# Patient Record
Sex: MALE | Race: BLACK OR AFRICAN AMERICAN | Employment: FULL TIME | URBAN - METROPOLITAN AREA
[De-identification: names, ages, dates, MRNs, and addresses within clinical notes are randomized per-mention and may not be internally consistent; named-entity substitution may affect disease eponyms.]

---

## 2023-10-07 ENCOUNTER — APPOINTMENT (OUTPATIENT)
Dept: RADIOLOGY | Facility: HOSPITAL | Age: 58
End: 2023-10-07
Payer: COMMERCIAL

## 2023-10-07 ENCOUNTER — HOSPITAL ENCOUNTER (OUTPATIENT)
Facility: HOSPITAL | Age: 58
Setting detail: OBSERVATION
Discharge: HOME | End: 2023-10-07
Attending: EMERGENCY MEDICINE | Admitting: INTERNAL MEDICINE
Payer: COMMERCIAL

## 2023-10-07 VITALS
WEIGHT: 264.66 LBS | HEART RATE: 88 BPM | RESPIRATION RATE: 18 BRPM | BODY MASS INDEX: 35.85 KG/M2 | OXYGEN SATURATION: 97 % | DIASTOLIC BLOOD PRESSURE: 111 MMHG | SYSTOLIC BLOOD PRESSURE: 151 MMHG | TEMPERATURE: 99 F | HEIGHT: 72 IN

## 2023-10-07 DIAGNOSIS — I10 PRIMARY HYPERTENSION: ICD-10-CM

## 2023-10-07 DIAGNOSIS — E11.9 TYPE 2 DIABETES MELLITUS WITHOUT COMPLICATION, WITHOUT LONG-TERM CURRENT USE OF INSULIN (MULTI): ICD-10-CM

## 2023-10-07 DIAGNOSIS — E11.69 HYPERLIPIDEMIA ASSOCIATED WITH TYPE 2 DIABETES MELLITUS (MULTI): ICD-10-CM

## 2023-10-07 DIAGNOSIS — R07.9 CHEST PAIN, UNSPECIFIED TYPE: Primary | ICD-10-CM

## 2023-10-07 DIAGNOSIS — E78.5 HYPERLIPIDEMIA ASSOCIATED WITH TYPE 2 DIABETES MELLITUS (MULTI): ICD-10-CM

## 2023-10-07 LAB
ALBUMIN SERPL BCP-MCNC: 4.4 G/DL (ref 3.4–5)
ALP SERPL-CCNC: 71 U/L (ref 33–120)
ALT SERPL W P-5'-P-CCNC: 39 U/L (ref 10–52)
ANION GAP SERPL CALC-SCNC: 15 MMOL/L (ref 10–20)
ANION GAP SERPL CALC-SCNC: 16 MMOL/L (ref 10–20)
AST SERPL W P-5'-P-CCNC: 31 U/L (ref 9–39)
BASOPHILS # BLD AUTO: 0.02 X10*3/UL (ref 0–0.1)
BASOPHILS # BLD AUTO: 0.02 X10*3/UL (ref 0–0.1)
BASOPHILS NFR BLD AUTO: 0.3 %
BASOPHILS NFR BLD AUTO: 0.4 %
BILIRUB SERPL-MCNC: 0.6 MG/DL (ref 0–1.2)
BUN SERPL-MCNC: 15 MG/DL (ref 6–23)
BUN SERPL-MCNC: 15 MG/DL (ref 6–23)
CALCIUM SERPL-MCNC: 9.2 MG/DL (ref 8.6–10.3)
CALCIUM SERPL-MCNC: 9.6 MG/DL (ref 8.6–10.3)
CARDIAC TROPONIN I PNL SERPL HS: 5 NG/L (ref 0–20)
CARDIAC TROPONIN I PNL SERPL HS: 5 NG/L (ref 0–20)
CARDIAC TROPONIN I PNL SERPL HS: 6 NG/L (ref 0–20)
CHLORIDE SERPL-SCNC: 95 MMOL/L (ref 98–107)
CHLORIDE SERPL-SCNC: 95 MMOL/L (ref 98–107)
CHOLEST SERPL-MCNC: 177 MG/DL (ref 0–199)
CHOLESTEROL/HDL RATIO: 5
CO2 SERPL-SCNC: 24 MMOL/L (ref 21–32)
CO2 SERPL-SCNC: 26 MMOL/L (ref 21–32)
CREAT SERPL-MCNC: 1.16 MG/DL (ref 0.5–1.3)
CREAT SERPL-MCNC: 1.43 MG/DL (ref 0.5–1.3)
D DIMER PPP FEU-MCNC: 299 NG/ML FEU
EOSINOPHIL # BLD AUTO: 0.17 X10*3/UL (ref 0–0.7)
EOSINOPHIL # BLD AUTO: 0.2 X10*3/UL (ref 0–0.7)
EOSINOPHIL NFR BLD AUTO: 2.9 %
EOSINOPHIL NFR BLD AUTO: 3.8 %
ERYTHROCYTE [DISTWIDTH] IN BLOOD BY AUTOMATED COUNT: 13.5 % (ref 11.5–14.5)
ERYTHROCYTE [DISTWIDTH] IN BLOOD BY AUTOMATED COUNT: 13.6 % (ref 11.5–14.5)
GFR SERPL CREATININE-BSD FRML MDRD: 57 ML/MIN/1.73M*2
GFR SERPL CREATININE-BSD FRML MDRD: 73 ML/MIN/1.73M*2
GLUCOSE BLD MANUAL STRIP-MCNC: 278 MG/DL (ref 74–99)
GLUCOSE SERPL-MCNC: 273 MG/DL (ref 74–99)
GLUCOSE SERPL-MCNC: 282 MG/DL (ref 74–99)
HCT VFR BLD AUTO: 45.9 % (ref 41–52)
HCT VFR BLD AUTO: 46 % (ref 41–52)
HDLC SERPL-MCNC: 35.2 MG/DL
HGB BLD-MCNC: 14.3 G/DL (ref 13.5–17.5)
HGB BLD-MCNC: 14.6 G/DL (ref 13.5–17.5)
IMM GRANULOCYTES # BLD AUTO: 0.02 X10*3/UL (ref 0–0.7)
IMM GRANULOCYTES # BLD AUTO: 0.02 X10*3/UL (ref 0–0.7)
IMM GRANULOCYTES NFR BLD AUTO: 0.3 % (ref 0–0.9)
IMM GRANULOCYTES NFR BLD AUTO: 0.4 % (ref 0–0.9)
LDLC SERPL CALC-MCNC: ABNORMAL MG/DL
LIPASE SERPL-CCNC: 80 U/L (ref 9–82)
LYMPHOCYTES # BLD AUTO: 1.59 X10*3/UL (ref 1.2–4.8)
LYMPHOCYTES # BLD AUTO: 1.78 X10*3/UL (ref 1.2–4.8)
LYMPHOCYTES NFR BLD AUTO: 27.4 %
LYMPHOCYTES NFR BLD AUTO: 33.9 %
MAGNESIUM SERPL-MCNC: 2 MG/DL (ref 1.6–2.4)
MCH RBC QN AUTO: 25.2 PG (ref 26–34)
MCH RBC QN AUTO: 25.4 PG (ref 26–34)
MCHC RBC AUTO-ENTMCNC: 31.2 G/DL (ref 32–36)
MCHC RBC AUTO-ENTMCNC: 31.7 G/DL (ref 32–36)
MCV RBC AUTO: 80 FL (ref 80–100)
MCV RBC AUTO: 81 FL (ref 80–100)
MONOCYTES # BLD AUTO: 0.41 X10*3/UL (ref 0.1–1)
MONOCYTES # BLD AUTO: 0.47 X10*3/UL (ref 0.1–1)
MONOCYTES NFR BLD AUTO: 7.1 %
MONOCYTES NFR BLD AUTO: 9 %
NEUTROPHILS # BLD AUTO: 2.76 X10*3/UL (ref 1.2–7.7)
NEUTROPHILS # BLD AUTO: 3.6 X10*3/UL (ref 1.2–7.7)
NEUTROPHILS NFR BLD AUTO: 52.5 %
NEUTROPHILS NFR BLD AUTO: 62 %
NON HDL CHOLESTEROL: 142 MG/DL (ref 0–149)
NRBC BLD-RTO: 0 /100 WBCS (ref 0–0)
NRBC BLD-RTO: 0 /100 WBCS (ref 0–0)
PLATELET # BLD AUTO: 225 X10*3/UL (ref 150–450)
PLATELET # BLD AUTO: 233 X10*3/UL (ref 150–450)
PMV BLD AUTO: 10.3 FL (ref 7.5–11.5)
PMV BLD AUTO: 10.9 FL (ref 7.5–11.5)
POTASSIUM SERPL-SCNC: 4.1 MMOL/L (ref 3.5–5.3)
POTASSIUM SERPL-SCNC: 4.1 MMOL/L (ref 3.5–5.3)
PROT SERPL-MCNC: 7.2 G/DL (ref 6.4–8.2)
RBC # BLD AUTO: 5.67 X10*6/UL (ref 4.5–5.9)
RBC # BLD AUTO: 5.74 X10*6/UL (ref 4.5–5.9)
SODIUM SERPL-SCNC: 131 MMOL/L (ref 136–145)
SODIUM SERPL-SCNC: 132 MMOL/L (ref 136–145)
TRIGL SERPL-MCNC: 733 MG/DL (ref 0–149)
TSH SERPL-ACNC: 1.91 MIU/L (ref 0.44–3.98)
VLDL: ABNORMAL
WBC # BLD AUTO: 5.3 X10*3/UL (ref 4.4–11.3)
WBC # BLD AUTO: 5.8 X10*3/UL (ref 4.4–11.3)

## 2023-10-07 PROCEDURE — 71045 X-RAY EXAM CHEST 1 VIEW: CPT | Mod: FY

## 2023-10-07 PROCEDURE — 36415 COLL VENOUS BLD VENIPUNCTURE: CPT | Performed by: EMERGENCY MEDICINE

## 2023-10-07 PROCEDURE — 99254 IP/OBS CNSLTJ NEW/EST MOD 60: CPT | Performed by: INTERNAL MEDICINE

## 2023-10-07 PROCEDURE — 84443 ASSAY THYROID STIM HORMONE: CPT | Performed by: NURSE PRACTITIONER

## 2023-10-07 PROCEDURE — 71045 X-RAY EXAM CHEST 1 VIEW: CPT | Performed by: RADIOLOGY

## 2023-10-07 PROCEDURE — 2500000001 HC RX 250 WO HCPCS SELF ADMINISTERED DRUGS (ALT 637 FOR MEDICARE OP): Performed by: EMERGENCY MEDICINE

## 2023-10-07 PROCEDURE — G0378 HOSPITAL OBSERVATION PER HR: HCPCS

## 2023-10-07 PROCEDURE — 96372 THER/PROPH/DIAG INJ SC/IM: CPT | Performed by: INTERNAL MEDICINE

## 2023-10-07 PROCEDURE — 80053 COMPREHEN METABOLIC PANEL: CPT | Performed by: EMERGENCY MEDICINE

## 2023-10-07 PROCEDURE — 96360 HYDRATION IV INFUSION INIT: CPT

## 2023-10-07 PROCEDURE — 96372 THER/PROPH/DIAG INJ SC/IM: CPT | Mod: 59 | Performed by: INTERNAL MEDICINE

## 2023-10-07 PROCEDURE — 96361 HYDRATE IV INFUSION ADD-ON: CPT

## 2023-10-07 PROCEDURE — 2580000001 HC RX 258 IV SOLUTIONS: Performed by: EMERGENCY MEDICINE

## 2023-10-07 PROCEDURE — 99222 1ST HOSP IP/OBS MODERATE 55: CPT | Performed by: NURSE PRACTITIONER

## 2023-10-07 PROCEDURE — 80048 BASIC METABOLIC PNL TOTAL CA: CPT | Performed by: NURSE PRACTITIONER

## 2023-10-07 PROCEDURE — 85025 COMPLETE CBC W/AUTO DIFF WBC: CPT | Performed by: EMERGENCY MEDICINE

## 2023-10-07 PROCEDURE — 85379 FIBRIN DEGRADATION QUANT: CPT | Performed by: NURSE PRACTITIONER

## 2023-10-07 PROCEDURE — 84484 ASSAY OF TROPONIN QUANT: CPT | Performed by: NURSE PRACTITIONER

## 2023-10-07 PROCEDURE — 84484 ASSAY OF TROPONIN QUANT: CPT | Performed by: EMERGENCY MEDICINE

## 2023-10-07 PROCEDURE — 83690 ASSAY OF LIPASE: CPT | Performed by: EMERGENCY MEDICINE

## 2023-10-07 PROCEDURE — 82947 ASSAY GLUCOSE BLOOD QUANT: CPT | Mod: 59

## 2023-10-07 PROCEDURE — 83735 ASSAY OF MAGNESIUM: CPT | Performed by: EMERGENCY MEDICINE

## 2023-10-07 PROCEDURE — 80061 LIPID PANEL: CPT | Performed by: NURSE PRACTITIONER

## 2023-10-07 PROCEDURE — 2500000001 HC RX 250 WO HCPCS SELF ADMINISTERED DRUGS (ALT 637 FOR MEDICARE OP): Performed by: NURSE PRACTITIONER

## 2023-10-07 PROCEDURE — 2500000004 HC RX 250 GENERAL PHARMACY W/ HCPCS (ALT 636 FOR OP/ED): Performed by: INTERNAL MEDICINE

## 2023-10-07 PROCEDURE — 99285 EMERGENCY DEPT VISIT HI MDM: CPT | Performed by: EMERGENCY MEDICINE

## 2023-10-07 PROCEDURE — 85025 COMPLETE CBC W/AUTO DIFF WBC: CPT | Performed by: NURSE PRACTITIONER

## 2023-10-07 RX ORDER — AMLODIPINE BESYLATE 5 MG/1
5 TABLET ORAL DAILY
Qty: 30 TABLET | Refills: 0 | Status: SHIPPED | OUTPATIENT
Start: 2023-10-08

## 2023-10-07 RX ORDER — ENOXAPARIN SODIUM 100 MG/ML
40 INJECTION SUBCUTANEOUS DAILY
Status: DISCONTINUED | OUTPATIENT
Start: 2023-10-07 | End: 2023-10-07 | Stop reason: HOSPADM

## 2023-10-07 RX ORDER — ATORVASTATIN CALCIUM 20 MG/1
20 TABLET, FILM COATED ORAL DAILY
COMMUNITY
Start: 2020-10-05 | End: 2023-10-07 | Stop reason: HOSPADM

## 2023-10-07 RX ORDER — METFORMIN HYDROCHLORIDE 500 MG/1
500 TABLET ORAL
Status: DISCONTINUED | OUTPATIENT
Start: 2023-10-08 | End: 2023-10-07 | Stop reason: HOSPADM

## 2023-10-07 RX ORDER — ALUMINUM HYDROXIDE, MAGNESIUM HYDROXIDE, AND SIMETHICONE 1200; 120; 1200 MG/30ML; MG/30ML; MG/30ML
20 SUSPENSION ORAL ONCE
Status: COMPLETED | OUTPATIENT
Start: 2023-10-07 | End: 2023-10-07

## 2023-10-07 RX ORDER — ACETAMINOPHEN 325 MG/1
650 TABLET ORAL EVERY 4 HOURS PRN
Status: DISCONTINUED | OUTPATIENT
Start: 2023-10-07 | End: 2023-10-07 | Stop reason: HOSPADM

## 2023-10-07 RX ORDER — ONDANSETRON HYDROCHLORIDE 2 MG/ML
4 INJECTION, SOLUTION INTRAVENOUS EVERY 8 HOURS PRN
Status: DISCONTINUED | OUTPATIENT
Start: 2023-10-07 | End: 2023-10-07 | Stop reason: HOSPADM

## 2023-10-07 RX ORDER — CHOLECALCIFEROL (VITAMIN D3) 25 MCG
5000 TABLET ORAL
Status: DISCONTINUED | OUTPATIENT
Start: 2023-10-07 | End: 2023-10-07 | Stop reason: HOSPADM

## 2023-10-07 RX ORDER — METFORMIN HYDROCHLORIDE 500 MG/1
500 TABLET ORAL
Qty: 30 TABLET | Refills: 0 | Status: SHIPPED | OUTPATIENT
Start: 2023-10-07

## 2023-10-07 RX ORDER — AMLODIPINE BESYLATE 5 MG/1
5 TABLET ORAL DAILY
Status: DISCONTINUED | OUTPATIENT
Start: 2023-10-07 | End: 2023-10-07 | Stop reason: HOSPADM

## 2023-10-07 RX ORDER — ALUMINUM HYDROXIDE, MAGNESIUM HYDROXIDE, AND SIMETHICONE 1200; 120; 1200 MG/30ML; MG/30ML; MG/30ML
20 SUSPENSION ORAL 4 TIMES DAILY PRN
Status: DISCONTINUED | OUTPATIENT
Start: 2023-10-07 | End: 2023-10-07 | Stop reason: HOSPADM

## 2023-10-07 RX ORDER — DEXTROSE 50 % IN WATER (D50W) INTRAVENOUS SYRINGE
25
Status: DISCONTINUED | OUTPATIENT
Start: 2023-10-07 | End: 2023-10-07 | Stop reason: HOSPADM

## 2023-10-07 RX ORDER — METFORMIN HYDROCHLORIDE 500 MG/1
500 TABLET ORAL
Status: ON HOLD | COMMUNITY
Start: 2020-09-02 | End: 2023-10-07 | Stop reason: SDUPTHER

## 2023-10-07 RX ORDER — ACETAMINOPHEN 650 MG/1
650 SUPPOSITORY RECTAL EVERY 4 HOURS PRN
Status: DISCONTINUED | OUTPATIENT
Start: 2023-10-07 | End: 2023-10-07 | Stop reason: HOSPADM

## 2023-10-07 RX ORDER — ATORVASTATIN CALCIUM 20 MG/1
20 TABLET, FILM COATED ORAL DAILY
Status: DISCONTINUED | OUTPATIENT
Start: 2023-10-07 | End: 2023-10-07

## 2023-10-07 RX ORDER — TALC
3 POWDER (GRAM) TOPICAL DAILY
Status: DISCONTINUED | OUTPATIENT
Start: 2023-10-07 | End: 2023-10-07 | Stop reason: HOSPADM

## 2023-10-07 RX ORDER — ASPIRIN 81 MG/1
81 TABLET ORAL DAILY PRN
COMMUNITY
Start: 2020-09-02

## 2023-10-07 RX ORDER — ASPIRIN 325 MG
325 TABLET ORAL ONCE
Status: COMPLETED | OUTPATIENT
Start: 2023-10-07 | End: 2023-10-07

## 2023-10-07 RX ORDER — ACETAMINOPHEN 500 MG
5000 TABLET ORAL
COMMUNITY
Start: 2020-09-02 | End: 2023-10-07 | Stop reason: HOSPADM

## 2023-10-07 RX ORDER — LISINOPRIL 10 MG/1
10 TABLET ORAL
COMMUNITY
Start: 2020-10-05 | End: 2023-10-07 | Stop reason: HOSPADM

## 2023-10-07 RX ORDER — DEXTROSE MONOHYDRATE 100 MG/ML
0.3 INJECTION, SOLUTION INTRAVENOUS ONCE AS NEEDED
Status: DISCONTINUED | OUTPATIENT
Start: 2023-10-07 | End: 2023-10-07 | Stop reason: HOSPADM

## 2023-10-07 RX ORDER — ATORVASTATIN CALCIUM 40 MG/1
40 TABLET, FILM COATED ORAL DAILY
Status: DISCONTINUED | OUTPATIENT
Start: 2023-10-08 | End: 2023-10-07 | Stop reason: HOSPADM

## 2023-10-07 RX ORDER — ATORVASTATIN CALCIUM 40 MG/1
40 TABLET, FILM COATED ORAL DAILY
Qty: 30 TABLET | Refills: 0 | Status: SHIPPED | OUTPATIENT
Start: 2023-10-08

## 2023-10-07 RX ORDER — ONDANSETRON 4 MG/1
4 TABLET, ORALLY DISINTEGRATING ORAL EVERY 8 HOURS PRN
Status: DISCONTINUED | OUTPATIENT
Start: 2023-10-07 | End: 2023-10-07 | Stop reason: HOSPADM

## 2023-10-07 RX ORDER — ACETAMINOPHEN 160 MG/5ML
650 SOLUTION ORAL EVERY 4 HOURS PRN
Status: DISCONTINUED | OUTPATIENT
Start: 2023-10-07 | End: 2023-10-07 | Stop reason: HOSPADM

## 2023-10-07 RX ORDER — PANTOPRAZOLE SODIUM 40 MG/10ML
40 INJECTION, POWDER, LYOPHILIZED, FOR SOLUTION INTRAVENOUS
Status: DISCONTINUED | OUTPATIENT
Start: 2023-10-08 | End: 2023-10-07 | Stop reason: HOSPADM

## 2023-10-07 RX ORDER — LISINOPRIL 10 MG/1
10 TABLET ORAL
Status: DISCONTINUED | OUTPATIENT
Start: 2023-10-07 | End: 2023-10-07

## 2023-10-07 RX ORDER — ASPIRIN 81 MG/1
81 TABLET ORAL
Status: DISCONTINUED | OUTPATIENT
Start: 2023-10-07 | End: 2023-10-07 | Stop reason: HOSPADM

## 2023-10-07 RX ORDER — PANTOPRAZOLE SODIUM 40 MG/1
40 TABLET, DELAYED RELEASE ORAL
Status: DISCONTINUED | OUTPATIENT
Start: 2023-10-08 | End: 2023-10-07 | Stop reason: HOSPADM

## 2023-10-07 RX ADMIN — LISINOPRIL 10 MG: 10 TABLET ORAL at 13:01

## 2023-10-07 RX ADMIN — ASPIRIN 81 MG: 81 TABLET, COATED ORAL at 12:58

## 2023-10-07 RX ADMIN — ATORVASTATIN CALCIUM 20 MG: 20 TABLET, FILM COATED ORAL at 12:59

## 2023-10-07 RX ADMIN — ALUMINUM HYDROXIDE, MAGNESIUM HYDROXIDE, AND DIMETHICONE 20 ML: 200; 20; 200 SUSPENSION ORAL at 01:04

## 2023-10-07 RX ADMIN — EMPAGLIFLOZIN 10 MG: 10 TABLET, FILM COATED ORAL at 17:41

## 2023-10-07 RX ADMIN — AMLODIPINE BESYLATE 5 MG: 5 TABLET ORAL at 17:41

## 2023-10-07 RX ADMIN — ASPIRIN 325 MG ORAL TABLET 325 MG: 325 PILL ORAL at 08:05

## 2023-10-07 RX ADMIN — SODIUM CHLORIDE, POTASSIUM CHLORIDE, SODIUM LACTATE AND CALCIUM CHLORIDE 1000 ML: 600; 310; 30; 20 INJECTION, SOLUTION INTRAVENOUS at 01:04

## 2023-10-07 RX ADMIN — ENOXAPARIN SODIUM 40 MG: 40 INJECTION SUBCUTANEOUS at 12:58

## 2023-10-07 RX ADMIN — Medication 5000 UNITS: at 12:59

## 2023-10-07 ASSESSMENT — PAIN DESCRIPTION - PAIN TYPE: TYPE: ACUTE PAIN

## 2023-10-07 ASSESSMENT — ACTIVITIES OF DAILY LIVING (ADL)
HEARING - LEFT EAR: FUNCTIONAL
ADEQUATE_TO_COMPLETE_ADL: YES
BATHING: INDEPENDENT
HEARING - RIGHT EAR: FUNCTIONAL
DRESSING YOURSELF: INDEPENDENT
GROOMING: INDEPENDENT
JUDGMENT_ADEQUATE_SAFELY_COMPLETE_DAILY_ACTIVITIES: YES
WALKS IN HOME: INDEPENDENT
FEEDING YOURSELF: INDEPENDENT
TOILETING: INDEPENDENT
PATIENT'S MEMORY ADEQUATE TO SAFELY COMPLETE DAILY ACTIVITIES?: YES

## 2023-10-07 ASSESSMENT — PAIN SCALES - GENERAL
PAINLEVEL_OUTOF10: 0 - NO PAIN
PAINLEVEL_OUTOF10: 6
PAINLEVEL_OUTOF10: 6

## 2023-10-07 ASSESSMENT — HEART SCORE
RISK FACTORS: >2 RISK FACTORS OR HX OF ATHEROSCLEROTIC DISEASE
ECG: NORMAL
AGE: 45-64
HEART SCORE: 3
HISTORY: SLIGHTLY SUSPICIOUS
TROPONIN: LESS THAN OR EQUAL TO NORMAL LIMIT

## 2023-10-07 ASSESSMENT — PAIN DESCRIPTION - DESCRIPTORS
DESCRIPTORS: PRESSURE
DESCRIPTORS: PRESSURE
DESCRIPTORS: SHARP;RADIATING

## 2023-10-07 ASSESSMENT — PAIN DESCRIPTION - LOCATION: LOCATION: CHEST

## 2023-10-07 ASSESSMENT — PAIN DESCRIPTION - DIRECTION: RADIATING_TOWARDS: LEFT ARM

## 2023-10-07 ASSESSMENT — PAIN DESCRIPTION - ORIENTATION: ORIENTATION: LEFT

## 2023-10-07 ASSESSMENT — COLUMBIA-SUICIDE SEVERITY RATING SCALE - C-SSRS
1. IN THE PAST MONTH, HAVE YOU WISHED YOU WERE DEAD OR WISHED YOU COULD GO TO SLEEP AND NOT WAKE UP?: NO
2. HAVE YOU ACTUALLY HAD ANY THOUGHTS OF KILLING YOURSELF?: NO
6. HAVE YOU EVER DONE ANYTHING, STARTED TO DO ANYTHING, OR PREPARED TO DO ANYTHING TO END YOUR LIFE?: NO

## 2023-10-07 ASSESSMENT — PAIN DESCRIPTION - FREQUENCY: FREQUENCY: INTERMITTENT

## 2023-10-07 ASSESSMENT — PAIN - FUNCTIONAL ASSESSMENT
PAIN_FUNCTIONAL_ASSESSMENT: 0-10
PAIN_FUNCTIONAL_ASSESSMENT: 0-10

## 2023-10-07 NOTE — NURSING NOTE
Pt left in stable condition with all discharge instructions , verbalizes understanding , encouraged to keep all scheduled appointments with good feed back.  Iv removed w/o incident . Pt has all belongings

## 2023-10-07 NOTE — CARE PLAN
Problem: Fall/Injury  Goal: Not fall by end of shift  Outcome: Progressing  Goal: Be free from injury by end of the shift  Outcome: Progressing  Goal: Verbalize understanding of personal risk factors for fall in the hospital  Outcome: Progressing  Goal: Verbalize understanding of risk factor reduction measures to prevent injury from fall in the home  Outcome: Progressing  Goal: Use assistive devices by end of the shift  Outcome: Progressing  Goal: Pace activities to prevent fatigue by end of the shift  Outcome: Progressing     Problem: Pain - Adult  Goal: Verbalizes/displays adequate comfort level or baseline comfort level  Outcome: Progressing     Problem: Safety - Adult  Goal: Free from fall injury  Outcome: Progressing     Problem: Discharge Planning  Goal: Discharge to home or other facility with appropriate resources  Outcome: Progressing     Problem: Chronic Conditions and Co-morbidities  Goal: Patient's chronic conditions and co-morbidity symptoms are monitored and maintained or improved  Outcome: Progressing   The patient's goals for the shift include      The clinical goals for the shift include      Over the shift, the patient did not make progress toward the following goals. Barriers to progression include . Recommendations to address these barriers include .

## 2023-10-07 NOTE — ED PROVIDER NOTES
Limitations to History: None    HPI: Patient presented to the emergency department due to chest pain.  Patient states that he has had intermittent episodes of chest pain over the last 2 days.  He had an episode of vomiting after the chest pain began that he related to drinking too much alcohol.  He states his pain is substernal, pressure and nonradiating.  He states he has had some intermittent left arm numbness and tingling but that has since resolved.  Denies any associated shortness of breath, dizziness, lightheadedness or diaphoresis.  Has not had previous cardiac testing.  Denies any tobacco or drug use.  Does have a family history of heart disease.  Review of systems is otherwise negative.  Additional History Obtained from: Sibling    ------------------------------------------------------------------------------------------------------------------------------------------  Physical Exam:    VS: As documented in the triage note and EMR flowsheet from this visit were reviewed.  General: Well appearing. No acute distress.   Eyes: Pupils round and reactive. No scleral icterus.   HENT: Atraumatic. Normocephalic. Moist mucous membranes.   CV: Regular rate. No pedal edema appreciated.  Resp: Clear to auscultation bilaterally. Non-labored.    GI: Soft, nontender to palpation. Nondistended.   Skin: Warm, dry, intact. No systemic rashes or lesions appreciated.  Neuro: Alert. No focal neuro deficits observed. Speech fluent. Answers questions appropriately.   Psych: Appropriate. Kempt.    ------------------------------------------------------------------------------------------------------------------------------------------    Medical Decision Making: Patient is presenting to the emergency department due to chest pain.  He states he has had waxing and waning symptoms for the last 2 days.  Is hemodynamically hypertensive but otherwise stable on exam.  Has a nonfocal neurologic exam.  No signs of fluid overload.  Due to the  patient's family history of cardiac disease and history of hypertension and hyperlipidemia as well as diabetes we will plan labs for cardiac evaluation.  No reproducible pain that be concerning for musculoskeletal etiology of the patient's symptoms.    Patient had short run of V. tach while he was in the emergency department for approximately 7-10 beats.  Was asymptomatic during this time.  No further episodes of the same.    Labs overall remarkable for slight hyponatremia.  Troponin within normal limits.  Patient was discussed with the medical service for observation.  Heart score 3    EKG interpreted by myself (ED attending physician), obtained at 0015 normal sinus rhythm at 83 bpm, normal intervals, no ST elevations, depressions or T wave inversions, ST flattening in lead III    External Records Reviewed: I reviewed recent and relevant outside records including: n/a    Independent Interpretation of Studies:  I independently interpreted labs, cxr as above    Escalation of Care:  Appropriate for obs    Social Determinants Affecting Care:  N/a    Prescription Drug Consideration: aspirin    Diagnostic testing considered: xray, labs, EKG    Discussion of Management with Other Providers:   I discussed the patient/results with: admitting team, Dr. Rahul Latham DO  Emergency Medicine      Shu Latham DO  10/07/23 4991

## 2023-10-07 NOTE — H&P
History Of Present Illness  Adilson De La Torre is a 57 y.o. male presenting with presents with chest pain midsternal radiating to his left arm.  He also states he had some palpitations.  He has been having the symptoms on and off.  He is a diabetic but has not been taking his diabetic medication.  He was originally on cholesterol medicine and blood pressure medicine he is also not taking that.  He states he does not currently have a PCP.  He states he was drinking over the last 2 days and noticed the symptoms more.  While he was in the ER and on the monitor he had a run of V. tach.  Due to his symptoms and his telemetry review he was admitted to have cardiology see him.     Past Medical History  He has no past medical history on file.  HTN, HLD, Type 2 DM    Surgical History  He has no past surgical history on file.     Social History  He has no history on file for tobacco use, alcohol use, and drug use.  Drinks alcohol  Denies smoking or drug use  Family History  No family history on file.     Allergies  Patient has no known allergies.    Review of Systems   Gen: No fatigue, fever, sweats.  Head: No headache, trauma.  Eyes: No vision loss, double vision, drainage, eye pain.  ENT: No hearing changes, pain, epistaxis, congestion  Cardiac: POSITIVE chest pain and palpitations  Pulmonary: No shortness of breath,  pleuritic pain,   Heme/lymph: No swollen glands  GI: No abdominal pain, nausea, vomiting, diarrhea  : No  dysuria, frequency, urgency, hematuria  Musculoskeletal: No limb pain, joint pain, back pain, joint swelling or stiffness.  Skin: No rashes, pruritus, lumps, lesions.  Neuro: No Numbness, tingling, or weakness.  Psych: No  anxiety     Review of systems is otherwise negative unless stated above or in history of present illness.   Physical Exam   General: Vital signs stable, Pt is alert, no acute distress  Eyes: Conjunctiva normal, PERRL, EOMs intact  HENMT: Normocephalic, atraumatic, external ears and nose  normal, no scars or masses.  No mastoid tenderness. Trachea is midline. No meningeal signs, negative Kernig and Brudzinski, moves neck freely.  No sinus tenderness  Resp: Respiratory effort is normal, no retractions, no stridor. Lungs CTA, no wheezes or rhonchi  CV: Heart is regular rate and rhythm.   Skin: No evidence of trauma, skin is warm and dry. No rashes, lesions or ulcers.  Skel: full range of motion of upper and lower extremities.   Neuro: Normal gait, CN II-XII intact, no motor or sensory changes.  Psych: Alert and oriented ×3, judgment is appropriate, normal mood and affect    Last Recorded Vitals  BP (!) 174/96 (BP Location: Left arm, Patient Position: Sitting)   Pulse 81   Temp 36.8 °C (98.2 °F) (Temporal)   Resp 18   Wt 120 kg (264 lb 10.6 oz)   SpO2 98%     Relevant Results      No current facility-administered medications on file prior to encounter.     Current Outpatient Medications on File Prior to Encounter   Medication Sig Dispense Refill    aspirin 81 mg EC tablet Take 1 tablet (81 mg) by mouth once daily as needed.      atorvastatin (Lipitor) 20 mg tablet Take 1 tablet (20 mg) by mouth once daily.      cholecalciferol (Vitamin D-3) 5,000 Units tablet Take 1 tablet (5,000 Units) by mouth once daily.      lisinopril 10 mg tablet Take 1 tablet (10 mg) by mouth once daily.      metFORMIN (Glucophage) 500 mg tablet Take 1 tablet (500 mg) by mouth once daily with a meal.        Results for orders placed or performed during the hospital encounter of 10/07/23 (from the past 24 hour(s))   Magnesium   Result Value Ref Range    Magnesium 2.00 1.60 - 2.40 mg/dL   Comprehensive metabolic panel   Result Value Ref Range    Glucose 273 (H) 74 - 99 mg/dL    Sodium 131 (L) 136 - 145 mmol/L    Potassium 4.1 3.5 - 5.3 mmol/L    Chloride 95 (L) 98 - 107 mmol/L    Bicarbonate 24 21 - 32 mmol/L    Anion Gap 16 10 - 20 mmol/L    Urea Nitrogen 15 6 - 23 mg/dL    Creatinine 1.16 0.50 - 1.30 mg/dL    eGFR 73 >60  mL/min/1.73m*2    Calcium 9.6 8.6 - 10.3 mg/dL    Albumin 4.4 3.4 - 5.0 g/dL    Alkaline Phosphatase 71 33 - 120 U/L    Total Protein 7.2 6.4 - 8.2 g/dL    AST 31 9 - 39 U/L    Bilirubin, Total 0.6 0.0 - 1.2 mg/dL    ALT 39 10 - 52 U/L   Lipase   Result Value Ref Range    Lipase 80 9 - 82 U/L   Troponin I, High Sensitivity   Result Value Ref Range    Troponin I, High Sensitivity 5 0 - 20 ng/L   CBC and Auto Differential   Result Value Ref Range    WBC 5.8 4.4 - 11.3 x10*3/uL    nRBC 0.0 0.0 - 0.0 /100 WBCs    RBC 5.74 4.50 - 5.90 x10*6/uL    Hemoglobin 14.6 13.5 - 17.5 g/dL    Hematocrit 46.0 41.0 - 52.0 %    MCV 80 80 - 100 fL    MCH 25.4 (L) 26.0 - 34.0 pg    MCHC 31.7 (L) 32.0 - 36.0 g/dL    RDW 13.5 11.5 - 14.5 %    Platelets 233 150 - 450 x10*3/uL    MPV 10.9 7.5 - 11.5 fL    Neutrophils % 62.0 40.0 - 80.0 %    Immature Granulocytes %, Automated 0.3 0.0 - 0.9 %    Lymphocytes % 27.4 13.0 - 44.0 %    Monocytes % 7.1 2.0 - 10.0 %    Eosinophils % 2.9 0.0 - 6.0 %    Basophils % 0.3 0.0 - 2.0 %    Neutrophils Absolute 3.60 1.20 - 7.70 x10*3/uL    Immature Granulocytes Absolute, Automated 0.02 0.00 - 0.70 x10*3/uL    Lymphocytes Absolute 1.59 1.20 - 4.80 x10*3/uL    Monocytes Absolute 0.41 0.10 - 1.00 x10*3/uL    Eosinophils Absolute 0.17 0.00 - 0.70 x10*3/uL    Basophils Absolute 0.02 0.00 - 0.10 x10*3/uL   Troponin I, High Sensitivity   Result Value Ref Range    Troponin I, High Sensitivity 6 0 - 20 ng/L   Troponin I, High Sensitivity   Result Value Ref Range    Troponin I, High Sensitivity 5 0 - 20 ng/L    XR chest 1 view    Result Date: 10/7/2023  Interpreted By:  Long Richardson, STUDY: XR CHEST 1 VIEW;  10/7/2023 1:37 am   INDICATION: Signs/Symptoms:cp.   COMPARISON: None.   ACCESSION NUMBER(S): [id number]   ORDERING CLINICIAN: TONE OLEA   FINDINGS: Overlying leads noted.   CARDIOMEDIASTINAL SILHOUETTE: Cardiomediastinal silhouette is normal in size and configuration.   LUNGS: No consolidation, pleural  effusion or pneumothorax.   ABDOMEN: No remarkable upper abdominal findings.   BONES: No acute osseous abnormality.       No acute cardiopulmonary process.   MACRO: None   Signed by: Long Richardson 10/7/2023 1:38 AM Dictation workstation:   [id number]         Assessment/Plan   Principal Problem:    Chest pain  -CBC wnl  -CMP   -Troponin 6/5  -Cardiac monitor  -Cardiology consult    HTN/HLD/DM  -renewed home meds    DVT prophylactics  -lovenox subcutaneous     Labs/Testing reviewed    Interdisciplinary team rounding completed with hospitalist, nurse, TCC    NP discussed plan and lab/testing results with Dr. Rdz    Continue cardiac monitor  Cardiology consult    * 45 minutes total spent on patient's care today; >50% time spent on counseling/coordination of care          Patricia Castillo, APRN-CNP

## 2023-10-07 NOTE — CONSULTS
Inpatient consult to Cardiology  Consult performed by: Nba Hernandez MD  Consult ordered by: Adilson Kay DO  Reason for consult: cp        History Of Present Illness:    Adilson De La Torre is a 57 y.o. male presenting with cp.  He is a pleasant 57-year-old -American male with a past medical history significant for type II non-insulin-requiring diabetes, hypertension, and hyperlipidemia.  He presents with a 1 week history of continuous anterior chest discomfort that is a generalized aching and feels like someone punched him in the chest.  It has been continuous for 1 week and waxes and wanes in severity.  It is worse with palpation and worse with stretching out his thorax.  It is also worse with deep inspiration.  He recently started exercising with bands including chest wall exercises.  He also last week forced himself to vomit after drinking excessively which involved retching.  His discomfort is not worse with exertion.  He also has noted intermittent paresthesias in his left hand.  He denies any shortness of breath.  The patient denies shortness of breath, palpitations, lightheadedness, syncope, orthopnea, paroxysmal nocturnal dyspnea, lower extremity edema, or bleeding problems.  Reportedly while in the emergency room he was felt to have a 7-10 beat episode of nonsustained VT.  There is no recording of that and no strips are available.    He has no prior history of coronary artery disease, cerebrovascular disease, venous thromboembolic disease, peripheral arterial disease, or bleeding.    He was diagnosed with hypertension and hyperlipidemia many years ago but does not take medications regularly.  He was also diagnosed with diabetes several years ago but stopped taking metformin.    He is a lifelong non-smoker.  He does binge drink on occasion.  He intermittently uses cocaine and last used it 1 month ago.  He also uses marijuana occasionally.    There is no family history of premature coronary disease or  "sudden death.     Last Recorded Vitals:  Vitals:    10/07/23 0730 10/07/23 0800 10/07/23 0842 10/07/23 1156   BP:  (!) 151/99 (!) 145/106 (!) 174/96   BP Location:   Left arm Left arm   Patient Position:   Sitting Sitting   Pulse: 78 75 75 81   Resp: 12 14 18 18   Temp:   36.5 °C (97.7 °F) 36.8 °C (98.2 °F)   TempSrc:   Temporal Temporal   SpO2: 99% 97% 98% 98%   Weight:       Height:           Last Labs:  CBC - 10/7/2023: 12:39 AM  5.8 14.6 233    46.0      CMP - 10/7/2023: 12:39 AM  9.6 7.2 31 --- 0.6   _ 4.4 39 71      PTT - No results in last year.  _   _ _     Troponin I, High Sensitivity   Date/Time Value Ref Range Status   10/07/2023 10:53 AM 5 0 - 20 ng/L Final   10/07/2023 02:47 AM 6 0 - 20 ng/L Final   10/07/2023 12:39 AM 5 0 - 20 ng/L Final        LABS:  CMP:  Results from last 7 days   Lab Units 10/07/23  0039   SODIUM mmol/L 131*   POTASSIUM mmol/L 4.1   CHLORIDE mmol/L 95*   CO2 mmol/L 24   ANION GAP mmol/L 16   BUN mg/dL 15   CREATININE mg/dL 1.16   EGFR mL/min/1.73m*2 73   MAGNESIUM mg/dL 2.00   ALBUMIN g/dL 4.4   ALT U/L 39   AST U/L 31   BILIRUBIN TOTAL mg/dL 0.6   LIPASE U/L 80     CBC:  Results from last 7 days   Lab Units 10/07/23  0039   WBC AUTO x10*3/uL 5.8   HEMOGLOBIN g/dL 14.6   HEMATOCRIT % 46.0   PLATELETS AUTO x10*3/uL 233   MCV fL 80     COAG:     ABO: No results found for: \"ABO\"  HEME/ENDO:     CARDIAC:   Results from last 7 days   Lab Units 10/07/23  1053 10/07/23  0247 10/07/23  0039   TROPHS ng/L 5 6 5       Last I/O:  No intake/output data recorded.    Past Cardiology Tests (Last 3 Years):  EKG:  I reviewed his electrocardiogram from today that shows normal sinus rhythm and was normal          Allergies:  Patient has no known allergies.    Inpatient Medications:  Scheduled medications   Medication Dose Route Frequency    amLODIPine  5 mg oral Daily    aspirin  81 mg oral Daily    [START ON 10/8/2023] atorvastatin  40 mg oral Daily    cholecalciferol  5,000 Units oral Daily    " empagliflozin  10 mg oral Daily    enoxaparin  40 mg subcutaneous Daily    melatonin  3 mg oral Daily    [START ON 10/8/2023] metFORMIN  500 mg oral Daily with breakfast    [START ON 10/8/2023] pantoprazole  40 mg oral Daily before breakfast    Or    [START ON 10/8/2023] pantoprazole  40 mg intravenous Daily before breakfast     PRN medications   Medication    acetaminophen    Or    acetaminophen    Or    acetaminophen    alum-mag hydroxide-simeth    dextrose 10 % in water (D10W)    dextrose    glucagon    ondansetron ODT    Or    ondansetron    ondansetron ODT    Or    ondansetron     Continuous Medications   Medication Dose Last Rate     Outpatient Medications:  Current Outpatient Medications   Medication Instructions    aspirin 81 mg, oral, Daily PRN    atorvastatin (LIPITOR) 20 mg, oral, Daily    cholecalciferol (VITAMIN D-3) 5,000 Units, oral, Daily RT    lisinopril 10 mg, oral, Daily RT    metFORMIN (GLUCOPHAGE) 500 mg, oral, Daily with breakfast       Physical Exam:  General: Well-developed well-nourished in no acute distress  HEENT: Normocephalic atraumatic  Neck: Supple, JVP is 7 cmH2O negative hepatojugular reflux 2+ carotid pulses without bruit  Pulmonary: Normal respiratory effort, clear to auscultation  Cardiovascular: No right ventricular heave, normal S1 and S2, no murmurs rubs or gallops  Abdomen: Soft nontender nondistended  Extremities: Warm without edema 2+ radial pulses bilaterally   Neurologic: Alert and oriented x3  Psychiatric: Normal mood and affect     Assessment/Plan   In summary Mr. De La Torre is a pleasant 57-year-old -American male past medical history significant for type II non-insulin-requiring diabetes, hypertension, hyperlipidemia, and history of cocaine use.  He presents with a 1 week history of continuous chest discomfort that is noncardiac in nature.  His troponins are all negative.  His chest discomfort is consistent with musculoskeletal etiology.  He does have multiple risk  factors for coronary artery disease and I would recommend outpatient stress testing for further risk stratification.  I counseled him to stop using cocaine.  I also counseled him on the importance of compliance with medication and regular follow-up given his multiple risk factors for future event.  I recommend that he take aspirin 81 mg daily atorvastatin 40 mg daily and amlodipine 5 mg daily.  He should have a lipid profile and A1c.  He would benefit from an SGLT2 inhibitor.  He reportedly had a 7-10 beat run of nonsustained VT on the monitor.  This was not saved and I am not able to verify whether it was truly nonsustained VT.  I will check an echocardiogram to evaluate his LV function.  He can be discharged from the hospital with outpatient follow-up.    Code Status:  Full Code            Nba Hernandez MD

## 2023-10-07 NOTE — PROGRESS NOTES
Pharmacy Medication History Review    Adilson De La Torre is a 57 y.o. male admitted for Chest pain. Pharmacy reviewed the patient's utmdy-vl-jhtrwzcue medications and allergies for accuracy.    The list below reflectives the updated PTA list. Please review each medication in order reconciliation for additional clarification and justification.  Prior to Admission Medications   Prescriptions Last Dose Informant Patient Reported? Taking?   aspirin 81 mg EC tablet Unknown  Yes No   Sig: Take 1 tablet (81 mg) by mouth once daily as needed.   atorvastatin (Lipitor) 20 mg tablet 10/6/2023  Yes No   Sig: Take 1 tablet (20 mg) by mouth once daily.   cholecalciferol (Vitamin D-3) 5,000 Units tablet 10/6/2023  Yes No   Sig: Take 1 tablet (5,000 Units) by mouth once daily.   lisinopril 10 mg tablet 10/6/2023  Yes No   Sig: Take 1 tablet (10 mg) by mouth once daily.   metFORMIN (Glucophage) 500 mg tablet 10/6/2023  Yes No   Sig: Take 1 tablet (500 mg) by mouth once daily with a meal.      Facility-Administered Medications: None      Medications Prior to Admission   Medication Sig Dispense Refill Last Dose    aspirin 81 mg EC tablet Take 1 tablet (81 mg) by mouth once daily as needed.   Unknown    atorvastatin (Lipitor) 20 mg tablet Take 1 tablet (20 mg) by mouth once daily.   10/6/2023    cholecalciferol (Vitamin D-3) 5,000 Units tablet Take 1 tablet (5,000 Units) by mouth once daily.   10/6/2023    lisinopril 10 mg tablet Take 1 tablet (10 mg) by mouth once daily.   10/6/2023    metFORMIN (Glucophage) 500 mg tablet Take 1 tablet (500 mg) by mouth once daily with a meal.   10/6/2023        The list below reflectives the updated allergy list. Please review each documented allergy for additional clarification and justification.  Allergies  Reviewed by Nella Brooks RN on 10/7/2023   No Known Allergies         Below are additional concerns with the patient's PTA list.      Brittany Fay CPhT

## 2023-10-07 NOTE — DISCHARGE SUMMARY
Discharge Diagnosis  Chest pain    Issues Requiring Follow-Up  Chest pain    Discharge Meds     Your medication list        START taking these medications        Instructions Last Dose Given Next Dose Due   amLODIPine 5 mg tablet  Commonly known as: Norvasc  Start taking on: October 8, 2023      Take 1 tablet (5 mg) by mouth once daily. Do not start before October 8, 2023.       empagliflozin 10 mg  Commonly known as: Jardiance  Start taking on: October 8, 2023      Take 1 tablet (10 mg) by mouth once daily. Do not start before October 8, 2023.              CHANGE how you take these medications        Instructions Last Dose Given Next Dose Due   atorvastatin 40 mg tablet  Commonly known as: Lipitor  Start taking on: October 8, 2023  What changed:   medication strength  how much to take      Take 1 tablet (40 mg) by mouth once daily. Do not start before October 8, 2023.              CONTINUE taking these medications        Instructions Last Dose Given Next Dose Due   aspirin 81 mg EC tablet           metFORMIN 500 mg tablet  Commonly known as: Glucophage      Take 1 tablet (500 mg) by mouth once daily with a meal.              STOP taking these medications      cholecalciferol 5,000 Units tablet  Commonly known as: Vitamin D-3        lisinopril 10 mg tablet                  Where to Get Your Medications        These medications were sent to [facility][id number] - [facility] [address] AT [facility] [address] [place] [address] [place] [address] RD, [facility] [address]      Phone: [phone]   amLODIPine 5 mg tablet  atorvastatin 40 mg tablet  empagliflozin 10 mg  metFORMIN 500 mg tablet         Test Results Pending At Discharge  Pending Labs       Order Current Status    Extra Tubes In process    Extra Tubes In process    Mcdonald Top In process    Hemoglobin A1C In process    Lavender Top In process    Light Blue Top In process            Hospital Course   Principal Problem:    Chest pain  -CBC wnl  -CMP BS  273  -Troponin 6/5  -Cardiac monitor  -Cardiology consult     HTN/HLD/DM  -renewed home meds     DVT prophylactics  -lovenox subcutaneous     Labs/Testing reviewed     Interdisciplinary team rounding completed with hospitalist, nurse, TCC     NP discussed plan and lab/testing results with Dr. Rdz     Continue cardiac monitor  Cardiology consult we will continue him back on metformin and add Jardiance, he will be changed from lisinopril to amlodipine and start taking atorvastatin.  Ordered a stress echo to be done this week through cardiology department.  They will call him to schedule the appointment.  Referred him to PCP     * 45 minutes total spent on patient's care today; >50% time spent on counseling/coordination of care        Pertinent Physical Exam At Time of Discharge  Physical Exam  See progress note  Outpatient Follow-Up  No future appointments.      Patricia Castillo, APRN-CNP

## 2023-10-09 DIAGNOSIS — R07.9 CHEST PAIN, UNSPECIFIED TYPE: Primary | ICD-10-CM

## 2023-11-30 LAB
HOLD SPECIMEN: NORMAL
HOLD SPECIMEN: NORMAL

## 2023-12-13 LAB — HOLD SPECIMEN: NORMAL

## 2024-05-14 ENCOUNTER — OFFICE VISIT (OUTPATIENT)
Dept: ORTHOPEDIC SURGERY | Facility: CLINIC | Age: 59
End: 2024-05-14

## 2024-05-14 DIAGNOSIS — M24.542 CONTRACTURE OF JOINT OF FINGER OF LEFT HAND: Primary | ICD-10-CM

## 2024-05-14 PROCEDURE — 99204 OFFICE O/P NEW MOD 45 MIN: CPT | Performed by: ORTHOPAEDIC SURGERY

## 2024-05-14 PROCEDURE — 99214 OFFICE O/P EST MOD 30 MIN: CPT | Performed by: ORTHOPAEDIC SURGERY

## 2024-05-14 PROCEDURE — 1036F TOBACCO NON-USER: CPT | Performed by: ORTHOPAEDIC SURGERY

## 2024-05-14 NOTE — LETTER
May 20, 2024       No Recipients    Patient: Adilson De La Torre   YOB: 1965   Date of Visit: 5/14/2024       Dear Dr. Leblanc Recipients:    Thank you for referring Adilson De La Torre to me for evaluation. Below are my notes for this consultation.  If you have questions, please do not hesitate to call me. I look forward to following your patient along with you.       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  I would not expect any improvement from here, even with Occupational Therapy, given the duration of the symptoms.  I reviewed the option of PIP contracture release, along with the major risks and benefits of the procedure.  This would be done under general anesthetic at the location of his convenience.  He will contact the office to set up an exact surgical date.    Continue with unrestricted activity in the meantime.        HISTORY OF PRESENT ILLNESS       Patient is a 58 y.o. right-hand dominant male retired , who presents today for evaluation of his left long finger.  He jammed it playing basketball about 6 months ago.  He had pain and swelling at the PIP joint and initially splinted it for a few days.  As he advanced activity, joint became more and more stiff in flexion.  No difficulty with .  No numbness or tingling.  No similar problems in other digits.  He does have a history of remote fourth metacarpal shaft fracture on that side, but nothing in the long ray.    He reports he is diabetic but not hypothyroid.  He does not smoke.      REVIEW OF SYSTEMS       A 30-item multi-system Review Of Systems was obtained on today's intake form.  This was reviewed with the patient and is correct.  The pertinent  positives and negatives are listed above.  The form has been scanned separately into the medical record.      PHYSICAL EXAM    Constitutional:    Appears stated age. Well-developed and well-nourished overweight male in no acute distress.  Psychiatric:         Pleasant normal mood and affect. Behavior is appropriate for the situation.   Head:                   Normocephalic and atraumatic.  Eyes:                    Pupils are equal and round.  Cardiovascular:  2+ radial and ulnar pulses. Fingers well-perfused.  Respiratory:        Effort normal. No respiratory distress. Speaking in complete sentences.  Neurologic:       Alert and oriented to person, place, and time.  Skin:                Skin is intact, warm and dry.  Hematologic / Lymphatic:    No lymphedema or lymphangitis.    Extremities / Musculoskeletal:                      Skin of left long finger and hand is intact with no erythema, ecchymosis, or diffuse swelling.  Normal skin drag and coloration.  Full composite finger flexion extension except left long finger PIP which has 90 degree flexion contracture.  Passively I can bend him up to about 100.  Good DIP pull-through.  Good reversal to full composite flexion.  Normal rotation with no scissoring.  Joints are stable to varus and valgus stress.  Sensation intact to light touch in all distributions.  Capillary refill less than 2 seconds.  Symmetric wrist and forearm motion.  Negative Juan.  Negative midcarpal shift.      IMAGING / LABS / EMGs           None pertinent      No past medical history on file.    Medication Documentation Review Audit       Reviewed by Brittany Fay CPhT (Technician) on 10/07/23 at 1301      Medication Order Taking? Sig Documenting Provider Last Dose Status   aspirin 81 mg EC tablet [id number] No Take 1 tablet (81 mg) by mouth once daily as needed. Historical Provider, MD Unknown Active   atorvastatin (Lipitor) 20 mg tablet [id number] No Take 1 tablet (20 mg) by mouth once  daily. Historical Provider, MD 10/6/2023 Active   cholecalciferol (Vitamin D-3) 5,000 Units tablet [id number] No Take 1 tablet (5,000 Units) by mouth once daily. Historical Provider, MD 10/6/2023 Active   lisinopril 10 mg tablet [id number] No Take 1 tablet (10 mg) by mouth once daily. Historical Provider, MD 10/6/2023 Active   metFORMIN (Glucophage) 500 mg tablet [id number] No Take 1 tablet (500 mg) by mouth once daily with a meal. Historical Provider, MD 10/6/2023 Active                    No Known Allergies    Social History     Socioeconomic History   • Marital status: Single     Spouse name: Not on file   • Number of children: Not on file   • Years of education: Not on file   • Highest education level: Not on file   Occupational History   • Not on file   Tobacco Use   • Smoking status: Not on file   • Smokeless tobacco: Not on file   Substance and Sexual Activity   • Alcohol use: Not on file   • Drug use: Not on file   • Sexual activity: Not on file   Other Topics Concern   • Not on file   Social History Narrative   • Not on file     Social Determinants of Health     Financial Resource Strain: Not on File (6/8/2020)    Received from [facility]     Financial Resource Strain    • Financial Resource Strain: 0   Food Insecurity: Unknown (8/27/2022)    Received from [facility]    Hunger Vital Sign    • Worried About Running Out of Food in the Last Year: Never true    • Ran Out of Food in the Last Year: Not on file   Transportation Needs: Not on File (6/8/2020)    Received from [facility]     Transportation Needs    • Transportation: 0   Physical Activity: Not on File (6/8/2020)    Received from [facility]     Physical Activity    • Physical Activity: 0   Stress: Not on File (6/8/2020)    Received from [facility]     Stress    • Stress: 0   Social Connections: Not on File (6/8/2020)    Received from [facility]     Social Connections    • Social Connections and Isolation: 0   Intimate Partner Violence: Not on file   Housing Stability: Not  on File (2020)    Received from [employer] Stability    • Housin       No past surgical history on file.    SURGICAL INDICATION    I reviewed the options for further management of this condition and the likely success rates of each.  The patient feels that they have maximized the benefits of conservative care, and they do want to go on to surgery.    I reviewed the major risks of surgery including infection; scarring; damage to nerves, tendons, or vessels; stiffness; failure to relieve contracture, recurrent contracture, joint instability,  and wound healing problems, as well as anesthesia risks.  I answered their questions to their satisfaction.  They were given my contact information and will contact the office when they are ready to schedule an exact surgical date.  Surgery will be posted as follows :    Dx :          Left long PIP flexion contracture  ICD-10 :      M24.542  Procedure :     Left long PIP flexion contracture release  CPT :        51196  Anesth :    General  Location :   [facility]  Duration :    60 minutes  Specials :    Lead hand  PAT :       No  Post-Op Visit :    10-15 days      Electronically Signed      MOE Bush MD      Orthopaedic Hand Surgery      [phone]

## 2024-05-14 NOTE — PROGRESS NOTES
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  I would not expect any improvement from here, even with Occupational Therapy, given the duration of the symptoms.  I reviewed the option of PIP contracture release, along with the major risks and benefits of the procedure.  This would be done under general anesthetic at the location of his convenience.  He will contact the office to set up an exact surgical date.    Continue with unrestricted activity in the meantime.        HISTORY OF PRESENT ILLNESS       Patient is a 58 y.o. right-hand dominant male retired , who presents today for evaluation of his left long finger.  He jammed it playing basketball about 6 months ago.  He had pain and swelling at the PIP joint and initially splinted it for a few days.  As he advanced activity, joint became more and more stiff in flexion.  No difficulty with .  No numbness or tingling.  No similar problems in other digits.  He does have a history of remote fourth metacarpal shaft fracture on that side, but nothing in the long ray.    He reports he is diabetic but not hypothyroid.  He does not smoke.      REVIEW OF SYSTEMS       A 30-item multi-system Review Of Systems was obtained on today's intake form.  This was reviewed with the patient and is correct.  The pertinent positives and negatives are listed above.  The form has been scanned separately into the medical record.      PHYSICAL EXAM    Constitutional:    Appears stated age. Well-developed and well-nourished overweight male in no acute distress.  Psychiatric:         Pleasant normal mood and affect. Behavior is appropriate for the situation.   Head:                   Normocephalic and atraumatic.  Eyes:                    Pupils are equal and round.  Cardiovascular:  2+ radial and ulnar pulses. Fingers  well-perfused.  Respiratory:        Effort normal. No respiratory distress. Speaking in complete sentences.  Neurologic:       Alert and oriented to person, place, and time.  Skin:                Skin is intact, warm and dry.  Hematologic / Lymphatic:    No lymphedema or lymphangitis.    Extremities / Musculoskeletal:                      Skin of left long finger and hand is intact with no erythema, ecchymosis, or diffuse swelling.  Normal skin drag and coloration.  Full composite finger flexion extension except left long finger PIP which has 90 degree flexion contracture.  Passively I can bend him up to about 100.  Good DIP pull-through.  Good reversal to full composite flexion.  Normal rotation with no scissoring.  Joints are stable to varus and valgus stress.  Sensation intact to light touch in all distributions.  Capillary refill less than 2 seconds.  Symmetric wrist and forearm motion.  Negative Juan.  Negative midcarpal shift.      IMAGING / LABS / EMGs           None pertinent      No past medical history on file.    Medication Documentation Review Audit       Reviewed by Brittany Fay CPhT (Technician) on 10/07/23 at 1301      Medication Order Taking? Sig Documenting Provider Last Dose Status   aspirin 81 mg EC tablet [id number] No Take 1 tablet (81 mg) by mouth once daily as needed. Historical Provider, MD Unknown Active   atorvastatin (Lipitor) 20 mg tablet [id number] No Take 1 tablet (20 mg) by mouth once daily. Maria Fernanda Melton MD 10/6/2023 Active   cholecalciferol (Vitamin D-3) 5,000 Units tablet [id number] No Take 1 tablet (5,000 Units) by mouth once daily. Maria Fernanda Melton MD 10/6/2023 Active   lisinopril 10 mg tablet [id number] No Take 1 tablet (10 mg) by mouth once daily. Maria Fernanda Melton MD 10/6/2023 Active   metFORMIN (Glucophage) 500 mg tablet [id number] No Take 1 tablet (500 mg) by mouth once daily with a meal. Maria Fernanda Melton MD 10/6/2023 Active                    No Known  Allergies    Social History     Socioeconomic History    Marital status: Single     Spouse name: Not on file    Number of children: Not on file    Years of education: Not on file    Highest education level: Not on file   Occupational History    Not on file   Tobacco Use    Smoking status: Not on file    Smokeless tobacco: Not on file   Substance and Sexual Activity    Alcohol use: Not on file    Drug use: Not on file    Sexual activity: Not on file   Other Topics Concern    Not on file   Social History Narrative    Not on file     Social Determinants of Health     Financial Resource Strain: Not on File (2020)    Received from [facility]     Financial Resource Strain     Financial Resource Strain: 0   Food Insecurity: Unknown (2022)    Received from [facility]    Hunger Vital Sign     Worried About Running Out of Food in the Last Year: Never true     Ran Out of Food in the Last Year: Not on file   Transportation Needs: Not on File (2020)    Received from [facility]     Transportation Needs     Transportation: 0   Physical Activity: Not on File (2020)    Received from [facility]     Physical Activity     Physical Activity: 0   Stress: Not on File (2020)    Received from [facility]     Stress     Stress: 0   Social Connections: Not on File (2020)    Received from [facility]     Social Connections     Social Connections and Isolation: 0   Intimate Partner Violence: Not on file   Housing Stability: Not on File (2020)    Received from [facility]     Housing Stability     Housin       No past surgical history on file.    SURGICAL INDICATION    I reviewed the options for further management of this condition and the likely success rates of each.  The patient feels that they have maximized the benefits of conservative care, and they do want to go on to surgery.    I reviewed the major risks of surgery including infection; scarring; damage to nerves, tendons, or vessels; stiffness; failure to relieve contracture,  recurrent contracture, joint instability,  and wound healing problems, as well as anesthesia risks.  I answered their questions to their satisfaction.  They were given my contact information and will contact the office when they are ready to schedule an exact surgical date.  Surgery will be posted as follows :    Dx :          Left long PIP flexion contracture  ICD-10 :      M24.542  Procedure :     Left long PIP flexion contracture release  CPT :        12483  Anesth :    General  Location :   [facility]  Duration :    60 minutes  Specials :    Lead hand  PAT :       No  Post-Op Visit :    10-15 days      Electronically Signed      MOE Bush MD      Orthopaedic Hand Surgery      [phone]

## 2024-05-20 DIAGNOSIS — M25.542 ARTHRALGIA OF LEFT HAND: ICD-10-CM

## 2024-05-20 DIAGNOSIS — M24.542 CONTRACTURE OF JOINT OF FINGER OF LEFT HAND: Primary | ICD-10-CM

## 2024-05-21 PROBLEM — M25.542 ARTHRALGIA OF LEFT HAND: Status: ACTIVE | Noted: 2024-05-20

## 2024-05-28 ENCOUNTER — APPOINTMENT (OUTPATIENT)
Dept: PREADMISSION TESTING | Facility: HOSPITAL | Age: 59
End: 2024-05-28
Payer: COMMERCIAL